# Patient Record
Sex: MALE | Race: BLACK OR AFRICAN AMERICAN | Employment: FULL TIME | URBAN - METROPOLITAN AREA
[De-identification: names, ages, dates, MRNs, and addresses within clinical notes are randomized per-mention and may not be internally consistent; named-entity substitution may affect disease eponyms.]

---

## 2017-05-25 ENCOUNTER — APPOINTMENT (OUTPATIENT)
Dept: GENERAL RADIOLOGY | Age: 22
End: 2017-05-25
Attending: EMERGENCY MEDICINE
Payer: COMMERCIAL

## 2017-05-25 ENCOUNTER — HOSPITAL ENCOUNTER (EMERGENCY)
Age: 22
Discharge: HOME | End: 2017-05-25
Attending: EMERGENCY MEDICINE
Payer: COMMERCIAL

## 2017-05-25 VITALS
SYSTOLIC BLOOD PRESSURE: 145 MMHG | OXYGEN SATURATION: 99 % | WEIGHT: 160 LBS | TEMPERATURE: 98.9 F | DIASTOLIC BLOOD PRESSURE: 90 MMHG | HEART RATE: 94 BPM | RESPIRATION RATE: 19 BRPM | HEIGHT: 69 IN | BODY MASS INDEX: 23.7 KG/M2

## 2017-05-25 DIAGNOSIS — S90.32XA CONTUSION OF FOOT INCLUDING TOES, LEFT, INITIAL ENCOUNTER: Primary | ICD-10-CM

## 2017-05-25 DIAGNOSIS — S90.122A CONTUSION OF FOOT INCLUDING TOES, LEFT, INITIAL ENCOUNTER: Primary | ICD-10-CM

## 2017-05-25 PROCEDURE — 73630 X-RAY EXAM OF FOOT: CPT

## 2017-05-25 PROCEDURE — 99283 EMERGENCY DEPT VISIT LOW MDM: CPT

## 2017-05-25 RX ORDER — IBUPROFEN 800 MG/1
800 TABLET ORAL EVERY 8 HOURS
Qty: 15 TAB | Refills: 0 | Status: SHIPPED | OUTPATIENT
Start: 2017-05-25 | End: 2017-05-30

## 2017-05-25 NOTE — ED NOTES
Current Discharge Medication List      START taking these medications    Details   ibuprofen (MOTRIN) 800 mg tablet Take 1 Tab by mouth every eight (8) hours for 5 days.   Qty: 15 Tab, Refills: 0           Patient armband removed and shredded  Prescription given and reviewed with patient

## 2017-05-25 NOTE — LETTER
[facility] EMERGENCY DEPT 
[address] [id number] 
[phone] Work/School Note Date: 5/25/2017 To Whom It May concern: 
 
Maricel Echavarria was seen and treated today in the emergency room by the following provider(s): 
Attending Provider: Kristina Yeager MD 
Physician Assistant: Sheridan Jeffries PA-C. Maricel Echavarria return to work 5/28/17 Sincerely, Sheridan Jeffries PA-C

## 2017-05-25 NOTE — ED PROVIDER NOTES
Patient is a 25 y.o. male presenting with foot pain. The history is provided by the patient. Foot Pain    This is a new problem. The current episode started 6 to 12 hours ago. The problem has not changed since onset. The pain is present in the left foot and left toes. The quality of the pain is described as sharp. The pain is moderate. Pertinent negatives include no numbness, full range of motion, no stiffness, no tingling, no itching, no back pain and no neck pain. The symptoms are aggravated by palpation, standing and activity. He has tried nothing for the symptoms. There has been a history of trauma (crush injury by object at work). History reviewed. No pertinent past medical history. History reviewed. No pertinent surgical history. History reviewed. No pertinent family history. Social History     Social History    Marital status: SINGLE     Spouse name: N/A    Number of children: N/A    Years of education: N/A     Occupational History    Not on file. Social History Main Topics    Smoking status: Not on file    Smokeless tobacco: Not on file    Alcohol use Not on file    Drug use: Not on file    Sexual activity: Not on file     Other Topics Concern    Not on file     Social History Narrative    No narrative on file         ALLERGIES: Review of patient's allergies indicates no known allergies. Review of Systems   Musculoskeletal: Negative for back pain, neck pain and stiffness. Skin: Negative for itching. Neurological: Negative for tingling and numbness. Skin: Negative for itching. Neurological: Negative for tingling and numbness. Review of Systems   Constitutional: Negative for chills and fever. HENT: Negative. Negative for congestion and facial swelling. Eyes: Negative for discharge and redness. Respiratory: Negative for cough and shortness of breath. Cardiovascular: Negative for chest pain.    Gastrointestinal: Negative for abdominal pain, nausea and vomiting. Endocrine: Negative. Genitourinary: Negative. Musculoskeletal: Negative for back pain and neck pain. Foot pain and swelling  Skin: Negative for rash and wound. Allergic/Immunologic: Negative. Neurological: Negative for dizziness, light-headedness and headaches. Hematological: Negative. Psychiatric/Behavioral: Negative. Skin: No laceration noted. Constitutional: He is oriented to person, place, and time. He appears well-developed and well-nourished. HENT:   Head: Normocephalic and atraumatic. Mouth/Throat: Oropharynx is clear and moist.   Eyes: Conjunctivae are normal.   Neck: Normal range of motion. Cardiovascular: Normal rate, regular rhythm and intact distal pulses. No murmur heard. Pulmonary/Chest: Effort normal. No respiratory distress. He has no wheezes. He has no rales. Abdominal: Soft. He exhibits no distension. Musculoskeletal: Normal range of motion. LT 1st toe TTP at nailbed and distal 1st digit. No swelling. Limited ROM of toe due to pain. Normal cap refill, sensation intact. Mild tenderness to base of 1st digit but no tenderness to foot or toes 2-3. Pedal pulse 2+  Neurological: He is alert and oriented to person, place, and time. Skin: Skin is warm and dry. No rash noted. Psychiatric: He has a normal mood and affect. Nursing note and vitals reviewed. Vitals:    05/25/17 1651   BP: 145/90   Pulse: 94   Resp: 19   Temp: 98.9 °F (37.2 °C)   SpO2: 99%   Weight: 72.6 kg (160 lb)   Height: 5' 9\" (1.753 m)            Physical Exam     MDM  Number of Diagnoses or Management Options  Contusion of foot including toes, left, initial encounter:   Diagnosis management comments: Impression: no fracture or dislocation appreciated on xray as read by self. No concern for [facility] fracture therefore no indication for splinting. Will give crutches and ibuprofen for comfort. Ortho f/u and work excuse.         Amount and/or Complexity of Data Reviewed  Tests in the radiology section of CPT®: ordered and reviewed      ED Course       Procedures

## 2017-05-25 NOTE — DISCHARGE INSTRUCTIONS
Bruises: Care Instructions  Your Care Instructions    Bruises occur when small blood vessels under the skin tear or rupture, most often from a twist, bump, or fall. Blood leaks into tissues under the skin and causes a black-and-blue spot that often turns colors, including purplish black, reddish blue, or yellowish green, as the bruise heals. Bruises hurt, but most are not serious and will go away on their own within 2 to 4 weeks. Sometimes, gravity causes them to spread down the body. A leg bruise usually will take longer to heal than a bruise on the face or arms. Follow-up care is a key part of your treatment and safety. Be sure to make and go to all appointments, and call your doctor if you are having problems. Its also a good idea to know your test results and keep a list of the medicines you take. How can you care for yourself at home? · Take pain medicines exactly as directed. ¨ If the doctor gave you a prescription medicine for pain, take it as prescribed. ¨ If you are not taking a prescription pain medicine, ask your doctor if you can take an over-the-counter medicine. · Put ice or a cold pack on the area for 10 to 20 minutes at a time. Put a thin cloth between the ice and your skin. · If you can, prop up the bruised area on pillows as much as possible for the next few days. Try to keep the bruise above the level of your heart. When should you call for help? Call your doctor now or seek immediate medical care if:  · You have signs of infection, such as:  ¨ Increased pain, swelling, warmth, or redness. ¨ Red streaks leading from the bruise. ¨ Pus draining from the bruise. ¨ A fever. · You have a bruise on your leg and signs of a blood clot, such as:  ¨ Increasing redness and swelling along with warmth, tenderness, and pain in the bruised area. ¨ Pain in your calf, back of the knee, thigh, or groin. ¨ Redness and swelling in your leg or groin. · Your pain gets worse.   Watch closely for changes in your health, and be sure to contact your doctor if:  · You do not get better as expected. Where can you learn more? Go to http://marii-darrick.info/. Enter [phone] [phone] in the search box to learn more about \"Bruises: Care Instructions. \"  Current as of: May 27, 2016  Content Version: 11.2  © 6475-3114 [employer] - [place]. Care instructions adapted under license by [employer] (which disclaims liability or warranty for this information). If you have questions about a medical condition or this instruction, always ask your healthcare professional. [place] [address] any warranty or liability for your use of this information. Contusion: Care Instructions  Your Care Instructions  Contusion is the medical term for a bruise. It is the result of a direct blow or an impact, such as a fall. Contusions are common sports injuries. Most people think of a bruise as a black-and-blue spot. This happens when small blood vessels get torn and leak blood under the skin. But bones, muscles, and organs can also get bruised. This may damage deep tissues but not cause a bruise you can see. The doctor will do a physical exam to find the location of your contusion. You may also have tests to make sure you do not have a more serious injury, such as a broken bone or nerve damage. These may include X-rays or other imaging tests like a CT scan or MRI. Deep-tissue contusions may cause pain and swelling. But if there is no serious damage, they will often get better in a few weeks with home treatment. The doctor has checked you carefully, but problems can develop later. If you notice any problems or new symptoms, get medical treatment right away. Follow-up care is a key part of your treatment and safety. Be sure to make and go to all appointments, and call your doctor if you are having problems.  It's also a good idea to know your test results and keep a list of the medicines you take.  How can you care for yourself at home? · Put ice or a cold pack on the sore area for 10 to 20 minutes at a time to stop swelling. Put a thin cloth between the ice pack and your skin. · Be safe with medicines. Read and follow all instructions on the label. ¨ If the doctor gave you a prescription medicine for pain, take it as prescribed. ¨ If you are not taking a prescription pain medicine, ask your doctor if you can take an over-the-counter medicine. · If you can, prop up the sore area on pillows as much as possible for the next few days. Try to keep the sore area above the level of your heart. When should you call for help? Call your doctor now or seek immediate medical care if:  · Your pain gets worse. · You have new or worse swelling. · You have tingling, weakness, or numbness in the area near the contusion. · The area near the contusion is cold or pale. Watch closely for changes in your health, and be sure to contact your doctor if:  · You do not get better as expected. Where can you learn more? Go to GeneriCo.be  Enter [id number] in the search box to learn more about \"Contusion: Care Instructions. \"   © 7529-6208 [employer]. Care instructions adapted under license by Branden Jones (which disclaims liability or warranty for this information). This care instruction is for use with your licensed healthcare professional. If you have questions about a medical condition or this instruction, always ask your healthcare professional. [place] [address] any warranty or liability for your use of this information.   Content Version: 58.6.239586; Current as of: May 22, 2015

## 2017-09-19 ENCOUNTER — HOSPITAL ENCOUNTER (INPATIENT)
Age: 22
LOS: 3 days | Discharge: HOME | DRG: 885 | End: 2017-09-22
Attending: EMERGENCY MEDICINE | Admitting: PSYCHIATRY & NEUROLOGY
Payer: COMMERCIAL

## 2017-09-19 DIAGNOSIS — F10.10 ALCOHOL ABUSE: ICD-10-CM

## 2017-09-19 DIAGNOSIS — R45.851 PASSIVE SUICIDAL IDEATIONS: Primary | ICD-10-CM

## 2017-09-19 PROBLEM — F32.A DEPRESSION: Status: ACTIVE | Noted: 2017-09-19

## 2017-09-19 LAB
ALBUMIN SERPL-MCNC: 4.3 G/DL (ref 3.4–5)
ALBUMIN/GLOB SERPL: 0.9 {RATIO} (ref 0.8–1.7)
ALP SERPL-CCNC: 100 U/L (ref 45–117)
ALT SERPL-CCNC: 192 U/L (ref 16–61)
AMPHET UR QL SCN: NEGATIVE
AMYLASE SERPL-CCNC: 92 U/L (ref 25–115)
ANION GAP SERPL CALC-SCNC: 8 MMOL/L (ref 3–18)
APAP SERPL-MCNC: <2 UG/ML (ref 10–30)
APPEARANCE UR: CLEAR
AST SERPL-CCNC: 234 U/L (ref 15–37)
ATRIAL RATE: 87 BPM
BACTERIA URNS QL MICRO: NEGATIVE /HPF
BARBITURATES UR QL SCN: NEGATIVE
BASOPHILS # BLD: 0 K/UL (ref 0–0.06)
BASOPHILS NFR BLD: 0 % (ref 0–2)
BENZODIAZ UR QL: NEGATIVE
BILIRUB SERPL-MCNC: 0.7 MG/DL (ref 0.2–1)
BILIRUB UR QL: NEGATIVE
BUN SERPL-MCNC: 10 MG/DL (ref 7–18)
BUN/CREAT SERPL: 9 (ref 12–20)
CALCIUM SERPL-MCNC: 9.4 MG/DL (ref 8.5–10.1)
CALCULATED P AXIS, ECG09: 76 DEGREES
CALCULATED R AXIS, ECG10: 97 DEGREES
CALCULATED T AXIS, ECG11: 47 DEGREES
CANNABINOIDS UR QL SCN: NEGATIVE
CHLORIDE SERPL-SCNC: 94 MMOL/L (ref 100–108)
CO2 SERPL-SCNC: 36 MMOL/L (ref 21–32)
COCAINE UR QL SCN: NEGATIVE
COLOR UR: YELLOW
CREAT SERPL-MCNC: 1.06 MG/DL (ref 0.6–1.3)
DIAGNOSIS, 93000: NORMAL
DIFFERENTIAL METHOD BLD: ABNORMAL
EOSINOPHIL # BLD: 0 K/UL (ref 0–0.4)
EOSINOPHIL NFR BLD: 0 % (ref 0–5)
EPITH CASTS URNS QL MICRO: ABNORMAL /LPF (ref 0–5)
ERYTHROCYTE [DISTWIDTH] IN BLOOD BY AUTOMATED COUNT: 13.3 % (ref 11.6–14.5)
ETHANOL SERPL-MCNC: 91 MG/DL (ref 0–3)
GLOBULIN SER CALC-MCNC: 4.6 G/DL (ref 2–4)
GLUCOSE SERPL-MCNC: 120 MG/DL (ref 74–99)
GLUCOSE UR STRIP.AUTO-MCNC: NEGATIVE MG/DL
HCT VFR BLD AUTO: 43.2 % (ref 36–48)
HDSCOM,HDSCOM: NORMAL
HGB BLD-MCNC: 15.1 G/DL (ref 13–16)
HGB UR QL STRIP: NEGATIVE
KETONES UR QL STRIP.AUTO: ABNORMAL MG/DL
LEUKOCYTE ESTERASE UR QL STRIP.AUTO: NEGATIVE
LIPASE SERPL-CCNC: 586 U/L (ref 73–393)
LIPASE SERPL-CCNC: 783 U/L (ref 73–393)
LYMPHOCYTES # BLD: 1 K/UL (ref 0.9–3.6)
LYMPHOCYTES NFR BLD: 17 % (ref 21–52)
MAGNESIUM SERPL-MCNC: 1.6 MG/DL (ref 1.6–2.6)
MCH RBC QN AUTO: 29.2 PG (ref 24–34)
MCHC RBC AUTO-ENTMCNC: 35 G/DL (ref 31–37)
MCV RBC AUTO: 83.4 FL (ref 74–97)
METHADONE UR QL: NEGATIVE
MONOCYTES # BLD: 0.4 K/UL (ref 0.05–1.2)
MONOCYTES NFR BLD: 7 % (ref 3–10)
MUCOUS THREADS URNS QL MICRO: ABNORMAL /LPF
NEUTS SEG # BLD: 4.4 K/UL (ref 1.8–8)
NEUTS SEG NFR BLD: 76 % (ref 40–73)
NITRITE UR QL STRIP.AUTO: NEGATIVE
OPIATES UR QL: NEGATIVE
P-R INTERVAL, ECG05: 130 MS
PCP UR QL: NEGATIVE
PH UR STRIP: 7 [PH] (ref 5–8)
PLATELET # BLD AUTO: 197 K/UL (ref 135–420)
PMV BLD AUTO: 8.9 FL (ref 9.2–11.8)
POTASSIUM SERPL-SCNC: 3.6 MMOL/L (ref 3.5–5.5)
PROT SERPL-MCNC: 8.9 G/DL (ref 6.4–8.2)
PROT UR STRIP-MCNC: 30 MG/DL
Q-T INTERVAL, ECG07: 346 MS
QRS DURATION, ECG06: 82 MS
QTC CALCULATION (BEZET), ECG08: 416 MS
RBC # BLD AUTO: 5.18 M/UL (ref 4.7–5.5)
RBC #/AREA URNS HPF: NEGATIVE /HPF (ref 0–5)
SALICYLATES SERPL-MCNC: <2.8 MG/DL (ref 2.8–20)
SODIUM SERPL-SCNC: 138 MMOL/L (ref 136–145)
SP GR UR REFRACTOMETRY: 1.01 (ref 1–1.03)
UROBILINOGEN UR QL STRIP.AUTO: 2 EU/DL (ref 0.2–1)
VENTRICULAR RATE, ECG03: 87 BPM
WBC # BLD AUTO: 5.9 K/UL (ref 4.6–13.2)
WBC URNS QL MICRO: ABNORMAL /HPF (ref 0–4)

## 2017-09-19 PROCEDURE — 80307 DRUG TEST PRSMV CHEM ANLYZR: CPT | Performed by: EMERGENCY MEDICINE

## 2017-09-19 PROCEDURE — 96361 HYDRATE IV INFUSION ADD-ON: CPT

## 2017-09-19 PROCEDURE — 93005 ELECTROCARDIOGRAM TRACING: CPT

## 2017-09-19 PROCEDURE — 83690 ASSAY OF LIPASE: CPT | Performed by: EMERGENCY MEDICINE

## 2017-09-19 PROCEDURE — 81001 URINALYSIS AUTO W/SCOPE: CPT | Performed by: EMERGENCY MEDICINE

## 2017-09-19 PROCEDURE — 74011250636 HC RX REV CODE- 250/636: Performed by: EMERGENCY MEDICINE

## 2017-09-19 PROCEDURE — 96375 TX/PRO/DX INJ NEW DRUG ADDON: CPT

## 2017-09-19 PROCEDURE — 82150 ASSAY OF AMYLASE: CPT | Performed by: EMERGENCY MEDICINE

## 2017-09-19 PROCEDURE — 99285 EMERGENCY DEPT VISIT HI MDM: CPT

## 2017-09-19 PROCEDURE — 83735 ASSAY OF MAGNESIUM: CPT | Performed by: EMERGENCY MEDICINE

## 2017-09-19 PROCEDURE — 74011000250 HC RX REV CODE- 250: Performed by: EMERGENCY MEDICINE

## 2017-09-19 PROCEDURE — 65220000003 HC RM SEMIPRIVATE PSYCH

## 2017-09-19 PROCEDURE — 85025 COMPLETE CBC W/AUTO DIFF WBC: CPT | Performed by: EMERGENCY MEDICINE

## 2017-09-19 PROCEDURE — 96374 THER/PROPH/DIAG INJ IV PUSH: CPT

## 2017-09-19 PROCEDURE — 74011250637 HC RX REV CODE- 250/637: Performed by: PSYCHIATRY & NEUROLOGY

## 2017-09-19 PROCEDURE — 80053 COMPREHEN METABOLIC PANEL: CPT | Performed by: EMERGENCY MEDICINE

## 2017-09-19 RX ORDER — LORAZEPAM 1 MG/1
2 TABLET ORAL
Status: DISCONTINUED | OUTPATIENT
Start: 2017-09-19 | End: 2017-09-22 | Stop reason: HOSPADM

## 2017-09-19 RX ORDER — PROMETHAZINE HYDROCHLORIDE 25 MG/ML
25 INJECTION, SOLUTION INTRAMUSCULAR; INTRAVENOUS
Status: DISCONTINUED | OUTPATIENT
Start: 2017-09-19 | End: 2017-09-19

## 2017-09-19 RX ORDER — HALOPERIDOL 5 MG/ML
5 INJECTION INTRAMUSCULAR
Status: DISCONTINUED | OUTPATIENT
Start: 2017-09-19 | End: 2017-09-22 | Stop reason: HOSPADM

## 2017-09-19 RX ORDER — CHLORDIAZEPOXIDE HYDROCHLORIDE 25 MG/1
25 CAPSULE, GELATIN COATED ORAL
Status: DISCONTINUED | OUTPATIENT
Start: 2017-09-19 | End: 2017-09-19

## 2017-09-19 RX ORDER — IBUPROFEN 400 MG/1
400 TABLET ORAL
Status: DISCONTINUED | OUTPATIENT
Start: 2017-09-19 | End: 2017-09-22 | Stop reason: HOSPADM

## 2017-09-19 RX ORDER — LORAZEPAM 2 MG/ML
2 INJECTION INTRAMUSCULAR
Status: DISCONTINUED | OUTPATIENT
Start: 2017-09-19 | End: 2017-09-22 | Stop reason: HOSPADM

## 2017-09-19 RX ORDER — LORAZEPAM 1 MG/1
1 TABLET ORAL
Status: DISCONTINUED | OUTPATIENT
Start: 2017-09-19 | End: 2017-09-20

## 2017-09-19 RX ORDER — LORAZEPAM 2 MG/ML
1 INJECTION INTRAMUSCULAR
Status: COMPLETED | OUTPATIENT
Start: 2017-09-19 | End: 2017-09-19

## 2017-09-19 RX ORDER — LORAZEPAM 2 MG/ML
1-2 INJECTION INTRAMUSCULAR
Status: DISCONTINUED | OUTPATIENT
Start: 2017-09-19 | End: 2017-09-19

## 2017-09-19 RX ORDER — IBUPROFEN 600 MG/1
600 TABLET ORAL
Status: DISCONTINUED | OUTPATIENT
Start: 2017-09-19 | End: 2017-09-19

## 2017-09-19 RX ORDER — LORAZEPAM 1 MG/1
1-2 TABLET ORAL
Status: DISCONTINUED | OUTPATIENT
Start: 2017-09-19 | End: 2017-09-19

## 2017-09-19 RX ORDER — ONDANSETRON 2 MG/ML
4 INJECTION INTRAMUSCULAR; INTRAVENOUS
Status: COMPLETED | OUTPATIENT
Start: 2017-09-19 | End: 2017-09-19

## 2017-09-19 RX ORDER — MAGNESIUM SULFATE HEPTAHYDRATE 500 MG/ML
1 INJECTION, SOLUTION INTRAMUSCULAR; INTRAVENOUS
Status: ACTIVE | OUTPATIENT
Start: 2017-09-19 | End: 2017-09-21

## 2017-09-19 RX ORDER — HALOPERIDOL 5 MG/1
5 TABLET ORAL
Status: DISCONTINUED | OUTPATIENT
Start: 2017-09-19 | End: 2017-09-22 | Stop reason: HOSPADM

## 2017-09-19 RX ORDER — IBUPROFEN 200 MG
1 TABLET ORAL DAILY
Status: DISCONTINUED | OUTPATIENT
Start: 2017-09-19 | End: 2017-09-22 | Stop reason: HOSPADM

## 2017-09-19 RX ORDER — TRAZODONE HYDROCHLORIDE 50 MG/1
50 TABLET ORAL
Status: DISCONTINUED | OUTPATIENT
Start: 2017-09-19 | End: 2017-09-22 | Stop reason: HOSPADM

## 2017-09-19 RX ORDER — PROMETHAZINE HYDROCHLORIDE 25 MG/1
25 SUPPOSITORY RECTAL
Status: DISCONTINUED | OUTPATIENT
Start: 2017-09-19 | End: 2017-09-19

## 2017-09-19 RX ADMIN — ONDANSETRON 4 MG: 2 INJECTION, SOLUTION INTRAMUSCULAR; INTRAVENOUS at 10:45

## 2017-09-19 RX ADMIN — LORAZEPAM 1 MG: 1 TABLET ORAL at 22:18

## 2017-09-19 RX ADMIN — LORAZEPAM 1 MG: 2 INJECTION INTRAMUSCULAR; INTRAVENOUS at 10:49

## 2017-09-19 RX ADMIN — SODIUM CHLORIDE 1000 ML: 900 INJECTION, SOLUTION INTRAVENOUS at 10:45

## 2017-09-19 RX ADMIN — Medication 2 CAPSULE: at 16:56

## 2017-09-19 RX ADMIN — FOLIC ACID: 5 INJECTION, SOLUTION INTRAMUSCULAR; INTRAVENOUS; SUBCUTANEOUS at 07:01

## 2017-09-19 RX ADMIN — LORAZEPAM 1 MG: 1 TABLET ORAL at 18:15

## 2017-09-19 NOTE — IP AVS SNAPSHOT
Magnus Ferrari 
 
 
 [address] [facility] [address] Patient: John Butler MRN: [id number] :1995 You are allergic to the following No active allergies Immunizations Administered for This Admission Name Date Influenza Vaccine (Quad) PF 2017 Recent Documentation Weight BMI Smoking Status 72.6 kg 23.63 kg/m2 Current Every Day Smoker Emergency Contacts Name Discharge Info Relation Home Work Mobile Mayela Mendosa  Mother [14] [phone] About your hospitalization You were admitted on:  2017 You last received care in the:  [facility] ADULT CHEM DEP You were discharged on:  2017 Unit phone number:  [phone] Why you were hospitalized Your primary diagnosis was:  Mdd (Major Depressive Disorder), Recurrent Severe, 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) Your diagnoses also included:  Alcohol Use Disorder, Severe, Dependence (Hcc), Alcohol Withdrawal Syndrome Without Complication (Hcc), Elevated Lfts, Elevated Lipase Providers Seen During Your Hospitalizations Provider Role Specialty Primary office phone Titi Falukner MD Attending Provider Emergency Medicine [phone] Harsh Mejia MD Attending Provider Emergency Medicine [phone] Pantera Ochoa MD Attending Provider Emergency Medicine [phone] Zackery Haynes MD Attending Provider Psychiatry [phone] Your Primary Care Physician (PCP) Primary Care Physician Office Phone Office Fax OTHER, PHYS ** None ** ** None ** Follow-up Information Follow up With Details Comments Contact Info Paolo Christopher MD   Patient can only remember the practice name and not the physician MALACHI SALEH On 10/11/2017 For therapy intake appointment with Mrs. Kay at 10:15 am. Will be provided a medication appointment a later date. [address] [facility] 
[phone] Current Discharge Medication List  
  
START taking these medications Dose & Instructions Dispensing Information Comments Morning Noon Evening Bedtime  
 amino acids/multivit with iron Cap capsule Commonly known as:  Alivia Cabralain Your last dose was: Your next dose is:    
   
   
 Dose:  2 Cap Take 2 Caps by mouth three (3) times daily (with meals). Indications: VITAMIN DEFICIENCY Quantity:  12 Cap Refills:  0  
     
   
   
   
  
 cloNIDine HCl 0.1 mg tablet Commonly known as:  CATAPRES Your last dose was: Your next dose is: Take 1 tablet (0.1mg) po TID for 1 day then decrease to a half tablet (0.05mg) po TID for 4 doses then discontinue. Indications: alcohol withdrawal  
 Quantity:  6 Tab Refills:  0 QUEtiapine 100 mg tablet Commonly known as:  SEROquel Your last dose was: Your next dose is:    
   
   
 Dose:  100 mg Take 1 Tab by mouth nightly. Indications: mood stabilization and insomnia related to alcohol withdrawal  
 Quantity:  30 Tab Refills:  0 Where to Get Your Medications Information on where to get these meds will be given to you by the nurse or doctor. ! Ask your nurse or doctor about these medications  
  amino acids/multivit with iron Cap capsule  
 cloNIDine HCl 0.1 mg tablet QUEtiapine 100 mg tablet Discharge Instructions BEHAVIORAL HEALTH NURSING DISCHARGE NOTE Numbers to remember: 
[facility]: [phone] [facility]: [phone] Suicide Preventions: [phone] (TALK) Diet: Regular The discharge information has been reviewed with the patient. The patient verbalized understanding. Patient armband removed and shredded Discharge Orders None [facility] Announcement We are excited to announce that we are making your provider's discharge notes available to you in ZimpleMoney. You will see these notes when they are completed and signed by the physician that discharged you from your recent hospital stay. If you have any questions or concerns about any information you see in ZimpleMoney, please call the Health Information Department where you were seen or reach out to your Primary Care Provider for more information about your plan of care. Introducing [facility]! Huma Santillan introduces ZimpleMoney patient portal. Now you can access parts of your medical record, email your doctor's office, and request medication refills online. 1. In your internet browser, go to [email or web address] 2. Click on the First Time User? Click Here link in the Sign In box. You will see the New Member Sign Up page. 3. Enter your ZimpleMoney Access Code exactly as it appears below. You will not need to use this code after youve completed the sign-up process. If you do not sign up before the expiration date, you must request a new code. · ZimpleMoney Access Code: [email or web address] Expires: 12/18/2017  6:56 AM 
 
4. Enter the last four digits of your Social Security Number (xxxx) and Date of Birth (mm/dd/yyyy) as indicated and click Submit. You will be taken to the next sign-up page. 5. Create a ZimpleMoney ID. This will be your ZimpleMoney login ID and cannot be changed, so think of one that is secure and easy to remember. 6. Create a ZimpleMoney password. You can change your password at any time. 7. Enter your Password Reset Question and Answer. This can be used at a later time if you forget your password. 8. Enter your e-mail address. You will receive e-mail notification when new information is available in [address] 9. Click Sign Up. You can now view and download portions of your medical record.  
10. Click the Download Summary menu link to download a portable copy of your medical information. If you have questions, please visit the Frequently Asked Questions section of the SocialCom[employer] website. Remember, MyChart is NOT to be used for urgent needs. For medical emergencies, dial 911. Now available from your iPhone and Android! General Information Please provide this summary of care documentation to your next provider. Patient Signature:  ____________________________________________________________ Date:  ____________________________________________________________  
  
Janel Fines Provider Signature:  ____________________________________________________________ Date:  ____________________________________________________________

## 2017-09-19 NOTE — ED NOTES
10:29 AM: Dr. Nigel Castillo evaluating patient at bedside. Consult:  Discussed care with arthur Agrawal. Standard discussion; including history of patients chief complaint, available diagnostic results, and treatment course. Will evaluate patient. 11:16 AM, 9/19/2017     Consult:  Discussed care with arthur Agrawal. Standard discussion; including history of patients chief complaint, available diagnostic results, and treatment course. Will admit for obs. 11:43 AM, 9/19/2017     Vitals:  Patient Vitals for the past 24 hrs:   Temp Pulse Resp BP   09/20/17 0730 97.9 °F (36.6 °C) 83 16 (!) 169/102         Medications ordered:   Medications   haloperidol (HALDOL) tablet 5 mg (not administered)   haloperidol lactate (HALDOL) injection 5 mg (not administered)   traZODone (DESYREL) tablet 50 mg (not administered)   LORazepam (ATIVAN) tablet 2 mg (2 mg Oral Given 9/20/17 2214)   LORazepam (ATIVAN) injection 2 mg (not administered)   amino acids/multivit with iron (MARYANN-RECOVER) capsule 2 Cap (2 Caps Oral Given 9/20/17 1708)   magnesium sulfate injection 1 g (not administered)   ibuprofen (MOTRIN) tablet 400 mg (not administered)   trimethobenzamide (TIGAN) injection 200 mg (not administered)   influenza vaccine 2017-18 (3 yrs+)(PF) (FLUZONE QUAD/FLUARIX QUAD) injection 0.5 mL (not administered)   nicotine (NICODERM CQ) 14 mg/24 hr patch 1 Patch (1 Patch TransDERmal Apply Patch 9/20/17 0833)   QUEtiapine (SEROquel) tablet 50 mg (50 mg Oral Given 9/20/17 2037)   ondansetron hcl (ZOFRAN) tablet 4 mg (not administered)   ondansetron (ZOFRAN) injection 4 mg (not administered)   cloNIDine HCl (CATAPRES) tablet 0.1 mg (0.1 mg Oral Given 9/20/17 2036)   0.9% sodium chloride 1,000 mL with mvi, adult no. 4 with vit K 10 mL, thiamine 062 mg, folic acid 1 mg infusion ( IntraVENous New Bag 9/19/17 0701)   sodium chloride 0.9 % bolus infusion 1,000 mL (1,000 mL IntraVENous New Bag 9/19/17 9681)   ondansetron (ZOFRAN) injection 4 mg (4 mg IntraVENous Given 9/19/17 1045)   LORazepam (ATIVAN) injection 1 mg (1 mg IntraVENous Given 9/19/17 1049)         Lab findings:  No results found for this or any previous visit (from the past 12 hour(s)). X-Ray, CT or other radiology findings or impressions:  No orders to display         Disposition:  Diagnosis:   1. Passive suicidal ideations    2. Alcohol abuse        Disposition: Admitted. Follow-up Information     None           There are no discharge medications for this patient. Scribe Attestation      Afsaneh acting as a scribe for and in the presence of Little Hope MD      September 19, 2017 at 10:30 AM       Provider Attestation:      I personally performed the services described in the documentation, reviewed the documentation, as recorded by the scribe in my presence, and it accurately and completely records my words and actions.  September 19, 2017 at 10:30 AM - Little Hope MD

## 2017-09-19 NOTE — BSMART NOTE
Comprehensive Assessment Form Part 1    Section I - Disposition      The Medical Doctor to Psychiatrist conference was completed. The Medical Doctor is in agreement with Psychiatrist disposition because of (reason) fleeting suicidal ideations and alcohol detox. The plan is admit to inpatient. The on-call Psychiatrist consulted was Dr. Blanche Yadav. The admitting Psychiatrist will be Dr. Blanche Yadav. The admitting Diagnosis is Depression, Alcohol Abuse. Admitted to room 125/01  Unit ADCD      Section II - Integrated Summary  Summary:  25year old male sent to the ER from [facility] for a mental health evaluation in regards to fleeting suicidal ideations and alcohol abuse. Interviewed in the ER by the request of Dr. Gianluca Ga. Patient dressed in hospital gown. Alert and oriented. Cooperative with interview. Appears anxious. States he has been under a lot of stress lately. Reports he and his girlfriend have lived together for 6 years. He recently bought a car and she left and went to [place] with her 1year old child, leaving him to care for their 4 month child. States he has been drinking heavily lately, consuming near a fifth of Gin daily. Reports is not sleeping well and appetite poor. Reports has been having emesis after trying to eat. Passively suicidal \" I just would rather not wake up. \"  Denied any homicidal ideations. Currently with mild hand tremors. Appears rather depressed and sad. Has never had any Psychiatric care before. No Meds, NKDA    Had a maternal Uncle who passed away from complications from alcohol dependence. Patient 2 month old is currently being cared for by the Grandmother. The patient is deemed competent to provide informed consent. The Chief Complaint is depression with fleeting suicidal ideations. Seeking alcohol detox  . The Precipitant Factors are relationship problems, Father of a 2 month old, alcohol abuse, and questionable depression untreated. .      Section V - Substance Abuse  The patient is using substances. The patient is using alcohol for 1-5 years with last use on 9/19/17. The patient has experienced the following withdrawal symptoms,  tremors, vomiting and sleep disturbance.       Georgie Leong RN

## 2017-09-19 NOTE — BH NOTES
Patient arrived on the unit escorted by security. Patient states that he has been depressed for about a year and denies active SI/HI. Patient states that his girlfriend took off with the car and her three year old child (1 y.o. Has a different father than patient). Patient has a 2 month old with his girlfriend who is currently with her grandmother (girlfriend's mother) until \"I can get right\". Patient admits to drinking a fifth of alcohol daily. Patient states that he has always drank, but over the past 6 moths he is drinking to drown out the depression. Patient is pleasant and cooperative and states that he just wants to get some help. Patient states that he is not able to get in touch with his girlfriend and her mother cannot either. Patient contracts for safety and denies all hallucinations. Patient was oriented to unit and rules were explained. Patient was allowed time for questions. Patient has noticeable tremors and states that he is a little nauseous but otherwise has no other signs of withdrawal currently.

## 2017-09-19 NOTE — ED PROVIDER NOTES
HPI Comments: 6:23 AM Hernán Zuniga is a 25 y.o. male with no PMHx who presents to ED for the evaluation of vomiting onset for the past two days. Pt is unable to keep any food or fluids including water down. He states that he six drinks ETOH beverages a day and smokes regularly. Pt also says that he is feeling SI due to the mother of his child leaving him alone to care for there [de-identified] old baby just recently. Pt states that he is here in the ED to receive help. No other complaints, associated symptoms or modifying factors at this time. PCP: Paolo Christopher MD            The history is provided by the patient. History reviewed. No pertinent past medical history. History reviewed. No pertinent surgical history. History reviewed. No pertinent family history. Social History     Social History    Marital status: SINGLE     Spouse name: N/A    Number of children: N/A    Years of education: N/A     Occupational History    Not on file. Social History Main Topics    Smoking status: Current Every Day Smoker    Smokeless tobacco: Not on file    Alcohol use Yes      Comment: 5-6 liquor drinks daily    Drug use: No    Sexual activity: Not on file     Other Topics Concern    Not on file     Social History Narrative         ALLERGIES: Review of patient's allergies indicates no known allergies. Review of Systems   Constitutional: Negative. HENT: Negative. Eyes: Negative. Respiratory: Negative. Cardiovascular: Negative. Gastrointestinal: Positive for vomiting. Endocrine: Negative. Genitourinary: Negative. Musculoskeletal: Negative. Skin: Negative. Allergic/Immunologic: Negative. Neurological: Negative. Hematological: Negative. Psychiatric/Behavioral: Positive for suicidal ideas. All other systems reviewed and are negative.       Vitals:    09/19/17 0734 09/19/17 0830 09/19/17 1032 09/19/17 1448   BP: 161/88 133/90 (!) 158/99 (!) 156/109   Pulse: 97 92 (!) 103 (!) 108   Resp: 19 17 18 18   Temp:   98.3 °F (36.8 °C) 98.7 °F (37.1 °C)   SpO2: 100% 97% 99%    Weight:                Physical Exam   Constitutional: He is oriented to person, place, and time. He appears well-developed and well-nourished. No distress. HENT:   Head: Normocephalic. Mouth/Throat: Oropharynx is clear and moist.   Eyes: Conjunctivae and EOM are normal. Pupils are equal, round, and reactive to light. Neck: Normal range of motion. Neck supple. Cardiovascular: Normal rate, regular rhythm, normal heart sounds and intact distal pulses. No murmur heard. Pulmonary/Chest: Effort normal and breath sounds normal. No respiratory distress. He has no wheezes. He has no rales. He exhibits no tenderness. Abdominal: Soft. Bowel sounds are normal. He exhibits no distension. There is no tenderness. There is no rebound. Musculoskeletal: Normal range of motion. He exhibits no edema or tenderness. Neurological: He is alert and oriented to person, place, and time. No cranial nerve deficit. He exhibits normal muscle tone. Coordination normal.   Skin: Skin is warm and dry. No rash noted. Psychiatric: He has a normal mood and affect. His behavior is normal. Judgment and thought content normal.   Nursing note and vitals reviewed.        MDM  Number of Diagnoses or Management Options     Amount and/or Complexity of Data Reviewed  Clinical lab tests: ordered and reviewed    Risk of Complications, Morbidity, and/or Mortality  Presenting problems: moderate  Diagnostic procedures: high  Management options: moderate    Patient Progress  Patient progress: stable    ED Course       Procedures    Vitals:  Patient Vitals for the past 12 hrs:   Temp Pulse Resp BP SpO2   09/19/17 1448 98.7 °F (37.1 °C) (!) 108 18 (!) 156/109 -   09/19/17 1032 98.3 °F (36.8 °C) (!) 103 18 (!) 158/99 99 %   09/19/17 0830 - 92 17 133/90 97 %         Medications ordered:   Medications   haloperidol (HALDOL) tablet 5 mg (not administered)   haloperidol lactate (HALDOL) injection 5 mg (not administered)   traZODone (DESYREL) tablet 50 mg (not administered)   LORazepam (ATIVAN) tablet 1 mg (1 mg Oral Given 9/19/17 1815)   LORazepam (ATIVAN) tablet 2 mg (not administered)   LORazepam (ATIVAN) injection 2 mg (not administered)   amino acids/multivit with iron (MARYANN-RECOVER) capsule 2 Cap (2 Caps Oral Given 9/19/17 1656)   magnesium sulfate injection 1 g (not administered)   ibuprofen (MOTRIN) tablet 400 mg (not administered)   trimethobenzamide (TIGAN) injection 200 mg (not administered)   influenza vaccine 2017-18 (3 yrs+)(PF) (FLUZONE QUAD/FLUARIX QUAD) injection 0.5 mL (not administered)   nicotine (NICODERM CQ) 14 mg/24 hr patch 1 Patch (1 Patch TransDERmal Apply Patch 9/19/17 1656)   0.9% sodium chloride 1,000 mL with mvi, adult no. 4 with vit K 10 mL, thiamine 044 mg, folic acid 1 mg infusion ( IntraVENous New Bag 9/19/17 0701)   sodium chloride 0.9 % bolus infusion 1,000 mL (1,000 mL IntraVENous New Bag 9/19/17 1045)   ondansetron (ZOFRAN) injection 4 mg (4 mg IntraVENous Given 9/19/17 1045)   LORazepam (ATIVAN) injection 1 mg (1 mg IntraVENous Given 9/19/17 1049)         Lab findings:  Recent Results (from the past 12 hour(s))   LIPASE    Collection Time: 09/19/17 12:23 PM   Result Value Ref Range    Lipase 783 (H) 73 - 393 U/L       Progress notes, Consult notes or additional Procedure notes: patient remained stable. 0700 - Took sign out from Dr. Porsche Awan on Mr. Winn, a 25year old male who presents to the ED for evaluation of passive SI, hallucinations, anxiety, and alcohol abuse. Worried that if he falls asleep that he \"won't wake up. \" Currently awaiting for UDS results, and plan is to speak with Crisis Stabilization and arrange for transfer to [facility] ED for further evaluation    Diagnosis: Suicidal ideation, depression, anxiety    Disposition: Transfer to [facility] ED    Follow-up Information     None           There are no discharge medications for this patient. Scribe Attestation:   Julianne Soliman acting as a scribe for and in the presence of Swapnil Norwood MD September 19, 2017 at 6:23 AM     Signed by: Nolan Vasquez, September 19, 2017 at 6:23 AM     Provider Attestation:   I personally performed the services described in the documentation, reviewed the documentation, as recorded by the scribe in my presence, and it accurately and completely records my words and actions. Reviewed and signed by:   Swapnil Norwood MD    6:57 AM  I have discussed case with Dr. Rosa Nazario discussion regarding this patient's presenting symptoms, PMHX, exam, vital signs, available ancillary and diagnostic studies. Consulting agrees to take over care of the patient at this time due to my going off duty.

## 2017-09-19 NOTE — BH NOTES
GROUP THERAPY PROGRESS NOTE    Armida Singh is participating in [place].      Group time: 30 min    Personal goal for participation: Positive social interaction    Goal orientation: community/social    Group therapy participation: active    Therapeutic interventions reviewed and discussed: Positive communication and social interaction    Impression of participation: Pt.was quiet but calm and cooperative

## 2017-09-19 NOTE — IP AVS SNAPSHOT
Crystal Alvarado 
 
 
 [address] [facility] [address] Patient: Ronny Edmonds MRN: [id number] :1995 Current Discharge Medication List  
  
START taking these medications Dose & Instructions Dispensing Information Comments Morning Noon Evening Bedtime  
 amino acids/multivit with iron Cap capsule Commonly known as:  Temitope Baker Your last dose was: Your next dose is:    
   
   
 Dose:  2 Cap Take 2 Caps by mouth three (3) times daily (with meals). Indications: VITAMIN DEFICIENCY Quantity:  12 Cap Refills:  0  
     
   
   
   
  
 cloNIDine HCl 0.1 mg tablet Commonly known as:  CATAPRES Your last dose was: Your next dose is: Take 1 tablet (0.1mg) po TID for 1 day then decrease to a half tablet (0.05mg) po TID for 4 doses then discontinue. Indications: alcohol withdrawal  
 Quantity:  6 Tab Refills:  0 QUEtiapine 100 mg tablet Commonly known as:  SEROquel Your last dose was: Your next dose is:    
   
   
 Dose:  100 mg Take 1 Tab by mouth nightly. Indications: mood stabilization and insomnia related to alcohol withdrawal  
 Quantity:  30 Tab Refills:  0 Where to Get Your Medications Information on where to get these meds will be given to you by the nurse or doctor. ! Ask your nurse or doctor about these medications  
  amino acids/multivit with iron Cap capsule  
 cloNIDine HCl 0.1 mg tablet QUEtiapine 100 mg tablet

## 2017-09-19 NOTE — IP AVS SNAPSHOT
Summary of Care Report The Summary of Care report has been created to help improve care coordination. Users with access to [employer] or [employer] [address] (Web-based application) may access additional patient information including the Discharge Summary. If you are not currently a [address] user and need more information, please call the number listed below in the [place] [address] section and ask to be connected with Medical Records. Facility Information Name Address Phone [address] [facility]  [address] [facility] [address] [phone] Patient Information Patient Name Sex  Kaden Aviles ([id number]) Male 1995 Discharge Information Admitting Provider Service Area Unit Vianey Apodaca MD / [address] Adult Chem Dep / [phone] Discharge Provider Discharge Date/Time Discharge Disposition Destination Vianey Apodaca MD / [phone] 17 1533 [facility] (none) Patient Language Language ENGLISH [13] Hospital Problems as of 2017  Never Reviewed Class Noted - Resolved Last Modified POA Active Problems * (Principal)MDD (major depressive disorder), recurrent severe, without psychosis ([facility]  2017 - Present 2017 by Vianey Apodaca MD Yes Entered by Vianey Apodaca MD  
  Alcohol use disorder, severe, dependence ([facility]  2017 - Present 2017 by Vianey Apodaca MD Yes Entered by Vianey Apodaca MD  
  Alcohol withdrawal syndrome without complication ([facility]   - Present 2017 by Vianey Apodaca MD Unknown Entered by Vianey Apodaca MD  
  Elevated LFTs  2017 - Present 2017 by Vianey Apodaca MD Unknown Entered by Vianey Apodaca MD  
  Elevated lipase  2017 - Present 2017 by Vianey Apodaca MD Unknown   Entered by Vianey Apodaca MD  
  
 You are allergic to the following No active allergies Current Discharge Medication List  
  
START taking these medications Dose & Instructions Dispensing Information Comments  
 amino acids/multivit with iron Cap capsule Commonly known as:  Oliver Reis Dose:  2 Cap Take 2 Caps by mouth three (3) times daily (with meals). Indications: VITAMIN DEFICIENCY Quantity:  12 Cap Refills:  0  
   
 cloNIDine HCl 0.1 mg tablet Commonly known as:  CATAPRES Take 1 tablet (0.1mg) po TID for 1 day then decrease to a half tablet (0.05mg) po TID for 4 doses then discontinue. Indications: alcohol withdrawal  
 Quantity:  6 Tab Refills:  0 QUEtiapine 100 mg tablet Commonly known as:  SEROquel Dose:  100 mg Take 1 Tab by mouth nightly. Indications: mood stabilization and insomnia related to alcohol withdrawal  
 Quantity:  30 Tab Refills:  0 Current Immunizations Name Date Influenza Vaccine (Quad) PF 9/22/2017 Follow-up Information Follow up With Details Comments Contact Info Phys Other, MD   Patient can only remember the practice name and not the physician MALACHI SALEH On 10/11/2017 For therapy intake appointment with Mrs. Kay at 10:15 am. Will be provided a medication appointment a later date. Doctor Zackery [address] [place] [address] 
[phone] Discharge Instructions BEHAVIORAL HEALTH NURSING DISCHARGE NOTE Numbers to remember: 
Julio Quispe: [phone] [facility]: [phone] Suicide Preventions: [phone] (TALK) Diet: Regular The discharge information has been reviewed with the patient. The patient verbalized understanding. Patient armband removed and shredded Chart Review Routing History No Routing History on File

## 2017-09-19 NOTE — ED NOTES
Pt volunteered information regarding psychological status during triage. States his significant other recently left him with custody of a 2 month old daughter, that he has been experiencing a significant amount of stress. Reports intermittent auditory hallucinations and intermittent suicidal thoughts; denies any suicidal thoughts at this time. Dr Lam Servant notified; orders placed.

## 2017-09-19 NOTE — ED NOTES
Patient states that he has not slept. He is afraid to go to sleep because he fears he will not wake back up.

## 2017-09-19 NOTE — ED NOTES
Patient arrived by EMS A&O x4, no signs of distress noted in patient at this time. Will call Behavioral Services to make them aware patient is here.

## 2017-09-19 NOTE — ED NOTES
Verbal and bedside report given to Ulysses Laundry (GINGER); shift change performed at bedside with pt/family aware of plan of care at this time. Pt awake/alert; slightly restless but without distress. Conversant; reports last ETOH approx 4 hrs ago. Pt history/situation/complaint/findings as well as MAR reviewed. Pt and family have no further needs at this time.

## 2017-09-19 NOTE — ED NOTES
Pt provided breakfast biscuit and juice, and urine sample sent to lab. No distress noted at this time.

## 2017-09-19 NOTE — BSMART NOTE
OCCUPATIONAL THERAPY PROGRESS NOTE  Group Time:  0608  Attendance: The patient attended full group. Participation:  The patient participated with moderate elaboration in the activity. Attention:  The patient was able to focus on the activity. Interaction:  The patient acknowledges others or responds to questions,  with no spontaneous interaction. Discussed alcohol abuse, girlfriend recently leaving with 3 y.o. (thinks she may be having \"postpartum\" and says no one knows where she is. States he cannot eat well and has been \"throwing up\" lately.

## 2017-09-20 PROBLEM — F10.20 ALCOHOL USE DISORDER, SEVERE, DEPENDENCE (HCC): Status: ACTIVE | Noted: 2017-09-20

## 2017-09-20 PROBLEM — F10.930 ALCOHOL WITHDRAWAL SYNDROME WITHOUT COMPLICATION (HCC): Status: ACTIVE | Noted: 2017-09-20

## 2017-09-20 PROBLEM — R74.8 ELEVATED LIPASE: Status: ACTIVE | Noted: 2017-09-20

## 2017-09-20 PROBLEM — F33.2 MDD (MAJOR DEPRESSIVE DISORDER), RECURRENT SEVERE, WITHOUT PSYCHOSIS (HCC): Status: ACTIVE | Noted: 2017-09-19

## 2017-09-20 PROBLEM — R79.89 ELEVATED LFTS: Status: ACTIVE | Noted: 2017-09-20

## 2017-09-20 LAB — LIPASE SERPL-CCNC: 906 U/L (ref 73–393)

## 2017-09-20 PROCEDURE — 65220000003 HC RM SEMIPRIVATE PSYCH

## 2017-09-20 PROCEDURE — 36415 COLL VENOUS BLD VENIPUNCTURE: CPT | Performed by: PSYCHIATRY & NEUROLOGY

## 2017-09-20 PROCEDURE — 74011250637 HC RX REV CODE- 250/637: Performed by: PSYCHIATRY & NEUROLOGY

## 2017-09-20 PROCEDURE — 83690 ASSAY OF LIPASE: CPT | Performed by: PSYCHIATRY & NEUROLOGY

## 2017-09-20 RX ORDER — QUETIAPINE FUMARATE 25 MG/1
50 TABLET, FILM COATED ORAL
Status: DISCONTINUED | OUTPATIENT
Start: 2017-09-20 | End: 2017-09-21

## 2017-09-20 RX ORDER — CLONIDINE HYDROCHLORIDE 0.1 MG/1
0.1 TABLET ORAL 3 TIMES DAILY
Status: DISCONTINUED | OUTPATIENT
Start: 2017-09-20 | End: 2017-09-21

## 2017-09-20 RX ORDER — ONDANSETRON 2 MG/ML
4 INJECTION INTRAMUSCULAR; INTRAVENOUS
Status: DISCONTINUED | OUTPATIENT
Start: 2017-09-20 | End: 2017-09-22 | Stop reason: HOSPADM

## 2017-09-20 RX ORDER — ONDANSETRON 4 MG/1
4 TABLET, FILM COATED ORAL
Status: DISCONTINUED | OUTPATIENT
Start: 2017-09-20 | End: 2017-09-22 | Stop reason: HOSPADM

## 2017-09-20 RX ADMIN — Medication 2 CAPSULE: at 17:08

## 2017-09-20 RX ADMIN — LORAZEPAM 2 MG: 1 TABLET ORAL at 19:35

## 2017-09-20 RX ADMIN — CLONIDINE HYDROCHLORIDE 0.1 MG: 0.1 TABLET ORAL at 20:36

## 2017-09-20 RX ADMIN — LORAZEPAM 1 MG: 1 TABLET ORAL at 08:29

## 2017-09-20 RX ADMIN — Medication 2 CAPSULE: at 08:28

## 2017-09-20 RX ADMIN — LORAZEPAM 2 MG: 1 TABLET ORAL at 22:14

## 2017-09-20 RX ADMIN — LORAZEPAM 2 MG: 1 TABLET ORAL at 12:04

## 2017-09-20 RX ADMIN — Medication 2 CAPSULE: at 12:04

## 2017-09-20 RX ADMIN — QUETIAPINE FUMARATE 50 MG: 25 TABLET, FILM COATED ORAL at 20:37

## 2017-09-20 NOTE — PROGRESS NOTES
conducted an initial consultation and Spiritual Assessment for Cosme Martínez, who is a 25 y. o.,male. Patients Primary Language is: [place]. According to the patients EMR Nondenominational Affiliation is: Unknown. The reason the Patient came to the hospital is:   Patient Active Problem List    Diagnosis Date Noted    Alcohol use disorder, severe, dependence ([facility] 09/20/2017    Alcohol withdrawal syndrome without complication ([facility] 34/03/1043    Elevated LFTs 09/20/2017    Elevated lipase 09/20/2017    MDD (major depressive disorder), recurrent severe, without psychosis ([facility] 09/19/2017        The  provided the following Interventions:  Initiated a relationship of care and support. Explored issues of jessica, belief, spirituality. Listened empathically. Provided information about Spiritual Care Services. Offered prayer on patient's behalf. Chart reviewed. The following outcomes where achieved:  Patient shared limited information about both their medical narrative and life journey. Patient processed and shared feelings / experiences with the group. Patient expressed gratitude for 's visit. Assessment:  Patient was calm and cooperative in coming to group, seemed to be listening. Patient shared about children, expressed that they were both a source of hope and pain because he misses them. Patient described goals of getting well and pursuing employment as a . Plan:  Chaplains will continue to follow and will provide pastoral care on an as needed/requested basis.  recommends bedside caregivers page  on duty if patient shows signs of acute spiritual or emotional distress. [facility].  [facility] [phone]

## 2017-09-20 NOTE — BH NOTES
Cosme Martínez is  participating in [place]. Group time: 7501    Personal goal for participation: Community announcement    Goal orientation: community    Group therapy participation: fully participated    Therapeutic interventions reviewed and discussed: Staff discussed  Staff discussed the Mental Health programs offered. Unit schedule for groups,  Visiting hours, patient advocate name and phone number and where this information is posted on the unit. , etc,,. Report any maintenance/housekeeping or treatment concerns to staff so it can be addressed. Impression of participation: Pt.did not have any maintenance/housekeeping or treatment concerns to report to staff .

## 2017-09-20 NOTE — BSMART NOTE
OCCUPATIONAL THERAPY PROGRESS NOTE  Group Time:  3427  Attendance: The patient attended full group. Participation:  The patient participated with minimal elaboration in the activity. Attention:  The patient was able to focus on the activity. Interaction:  The patient acknowledges others or responds to questions,  with no spontaneous interaction. Quieter today. Says he feels OK. Little insight or knowledge about substance abuse.

## 2017-09-20 NOTE — BH NOTES
Treatment team met -     Medical Director: ____present   Psychiatrist: __x___present   Charge nurse: _x____present   MSW: __x___present   : _x____present   Nurse Manager: __x___present   Student RNs: ____present   Medical Students: _____present   Art Therapist: __x___present   Clinical Coordinator: __x___present   Internal : __x___present   Occupational Therapist: __x___present   Chaplain beltran  present    Plan of care discussed and updated as appropriate.

## 2017-09-20 NOTE — H&P
History and Physical        Patient: Danial Gloria               Sex: male          DOA: 9/19/2017         YOB: 1995      Age:  25 y.o.        LOS:  LOS: 1 day        HPI:     Danial Gloria is a 25 y.o. male who was admitted experiencing depression and alcohol dependence. Principal Problem:    MDD (major depressive disorder), recurrent severe, without psychosis ([address] (9/19/2017)    Active Problems:    Alcohol use disorder, severe, dependence ([address] (9/20/2017)      Alcohol withdrawal syndrome without complication ([address] (9/54/9475)      Elevated LFTs (9/20/2017)      Elevated lipase (9/20/2017)        Past Medical History:   Diagnosis Date    Alcohol use disorder, severe, dependence ([address] 9/20/2017    Alcohol withdrawal syndrome without complication ([address] 2/13/4817    MDD (major depressive disorder), recurrent severe, without psychosis ([facility] 9/19/2017       History reviewed. No pertinent surgical history. History reviewed. No pertinent family history. Social History     Social History    Marital status: SINGLE     Spouse name: N/A    Number of children: 1    Years of education: Some college     Social History Main Topics    Smoking status: Current Every Day Smoker    Smokeless tobacco: None    Alcohol use Yes      Comment: 5-6 liquor drinks daily    Drug use: No    Sexual activity: Not Asked     Other Topics Concern    None     Social History Narrative   Patient states he lives with his brother. States appetite has been poor, sleep okay. He works in overnight stocking. Prior to Admission medications    Not on File       No Known Allergies    Review of Systems  A comprehensive review of systems was negative.       Physical Exam:      Visit Vitals    BP (!) 169/102 (BP 1 Location: Right arm, BP Patient Position: Sitting)    Pulse 83    Temp 97.9 °F (36.6 °C)    Resp 16    Wt 160 lb (72.6 kg)    SpO2 99%    BMI 23.63 kg/m2       Physical Exam:    General: Alert, cooperative, no distress, well developed, well nourished AA male,appears stated age. Eyes:  Conjunctivae/corneas clear. PERRL, EOMs intact. Fundi benign   Ears:  Normal TMs and external ear canals both ears. Nose: Nares normal. Septum midline. Mucosa normal. No drainage or sinus tenderness. Mouth/Throat: Lips, mucosa, and tongue normal. Teeth and gums normal.   Neck: Supple, symmetrical, trachea midline, no adenopathy, thyroid: no enlargement/tenderness/nodules, no carotid bruit and no JVD. Back:   Symmetric, no curvature. ROM normal. No CVA tenderness. Lungs:   Clear to auscultation bilaterally. Heart:  Regular rate and rhythm, S1, S2 normal, no murmur, click, rub or gallop. Abdomen:   Soft, non-tender. Bowel sounds normal. No masses,  No organomegaly. Extremities: Extremities normal, atraumatic, no cyanosis or edema. Pulses: 2+ and symmetric all extremities. Skin: Skin color, texture, turgor normal. No rashes or lesions   Lymph nodes: Cervical, supraclavicular, and axillary nodes normal.   Neurologic: CNII-XII intact. Normal strength, sensation and reflexes throughout.              Assessment/Plan     Depression  Alcohol dependence  Labs reviewed  Continue treatment per physician's orders

## 2017-09-20 NOTE — BH NOTES
GROUP THERAPY PROGRESS NOTE    Troy Tracy is participating in Recreational Therapy. Group time: 7518-6147    Personal goal for participation: fresh air break/games on the unit    Goal orientation: social    Group therapy participation: active    Therapeutic interventions reviewed and discussed: Staff encouraged Pt to get off the unit and go outside and get some fresh air, or play games on the unit with peers. Impression of participation:     Pt. chose to stay on the unit, play games with peers, color lynn patterns and watch a movie.

## 2017-09-20 NOTE — PROGRESS NOTES
Problem: Suicide/Homicide (Adult/Pediatric)  Goal: *STG: Remains safe in hospital  Pt will remain safe daily while hospitalized. Outcome: Progressing Towards Goal  Pt remains safe. Goal: *STG/LTG: Complies with medication therapy  Pt will take medications as ordered daily while hospitalized. Outcome: Progressing Towards Goal  Pt takes medications as ordered. Problem: Falls - Risk of  Goal: *Absence of Falls  Document Adonay Fall Risk and appropriate interventions in the flowsheet. Outcome: Progressing Towards Goal  Fall Risk Interventions:         Pt remains free of falls. Medication Interventions: Teach patient to arise slowly                       Comments:   Patient has been present in the milieu for most of the day. Patient continues to deny SI/HI and AVH. Patient was medicated for withdrawal earlier with Ativan. Patient presents as confused today and seems to not understand everything going on. Patient is calm and cooperative on the unit and has attended groups and taken medications as ordered.

## 2017-09-21 LAB — LIPASE SERPL-CCNC: 1001 U/L (ref 73–393)

## 2017-09-21 PROCEDURE — 83690 ASSAY OF LIPASE: CPT | Performed by: PSYCHIATRY & NEUROLOGY

## 2017-09-21 PROCEDURE — 74011250637 HC RX REV CODE- 250/637: Performed by: PSYCHIATRY & NEUROLOGY

## 2017-09-21 PROCEDURE — 36415 COLL VENOUS BLD VENIPUNCTURE: CPT | Performed by: PSYCHIATRY & NEUROLOGY

## 2017-09-21 PROCEDURE — 65220000003 HC RM SEMIPRIVATE PSYCH

## 2017-09-21 RX ORDER — QUETIAPINE FUMARATE 100 MG/1
100 TABLET, FILM COATED ORAL
Status: DISCONTINUED | OUTPATIENT
Start: 2017-09-21 | End: 2017-09-22 | Stop reason: HOSPADM

## 2017-09-21 RX ORDER — CLONIDINE HYDROCHLORIDE 0.1 MG/1
0.15 TABLET ORAL 3 TIMES DAILY
Status: DISCONTINUED | OUTPATIENT
Start: 2017-09-21 | End: 2017-09-22 | Stop reason: HOSPADM

## 2017-09-21 RX ADMIN — Medication 2 CAPSULE: at 08:53

## 2017-09-21 RX ADMIN — CLONIDINE HYDROCHLORIDE 0.15 MG: 0.1 TABLET ORAL at 20:51

## 2017-09-21 RX ADMIN — Medication 2 CAPSULE: at 12:44

## 2017-09-21 RX ADMIN — Medication 2 CAPSULE: at 17:13

## 2017-09-21 RX ADMIN — CLONIDINE HYDROCHLORIDE 0.1 MG: 0.1 TABLET ORAL at 08:53

## 2017-09-21 RX ADMIN — QUETIAPINE FUMARATE 100 MG: 100 TABLET, FILM COATED ORAL at 20:51

## 2017-09-21 RX ADMIN — CLONIDINE HYDROCHLORIDE 0.15 MG: 0.1 TABLET ORAL at 13:41

## 2017-09-21 NOTE — PROGRESS NOTES
Problem: Falls - Risk of  Goal: *Absence of Falls  Document Adonay Fall Risk and appropriate interventions in the flowsheet. Outcome: Progressing Towards Goal  Fall Risk Interventions:     Pt remains free of falls. Medication Interventions: Teach patient to arise slowly                       Problem: Alcohol Withdrawal  Goal: *STG: Participates in treatment plan  Pt will participate daily in his care. Outcome: Progressing Towards Goal  Pt is participating in his care. Goal: *STG: Seeks staff when symptoms of withdrawal increase  Pt will notify staff when symptoms of withdrawal increase. Outcome: Progressing Towards Goal  Pt continues to notify staff when symptoms of withdrawal increase. Comments:   Patient has been visible in the milieu for most of the shift. Patient continues to present as confused when any questions are asked including questions about his withdrawal symptoms. Patient denies SI/HI and AVH and appears calmer today, although was unable to tell me whether he was feeling any better with his symptoms of withdrawal. Patient looked at his arms and held them out in front of him to determine if he felt better and then seemed confused again about the question.

## 2017-09-21 NOTE — BH NOTES
Pt. has been visible in the milieu this shift. Pt.  appears calm and cooperative in the milieu socializing with staff and peers. Pt. denies SI/HI. AV/H. Pt contracts for safety on the unit. Pt.denies any new medical/pain issues. Pt. did not have any visitors. Staff encouraged Pt. to communicate concerns to the Treatment Team to ensure accurate treatment is addressed. Pt agreed. Pt. remain free of falls and provided non skid socks. Staff will continue to monitor Pt. for behavior safety and location.

## 2017-09-21 NOTE — BH NOTES
Pt.i s a 25year old male with no prior psych hospitalization. Pt was admitted to this facility for having ideations to harm self  After drinking alcohol. SW Contact:  SW met with pt to discuss d/c planning. Pt. expressed he was ready for d.c/  SW discussed the importance of alcohol detox and medication compliance. SW provided pt with mental health and substance abuse education. SW discussed positive coping skills, relapse prevention and safety plan. Pt. is currently denying ideations and hallucinations.

## 2017-09-21 NOTE — PROGRESS NOTES
[address] [facility] [address] [address]  Inpatient Progress Note     Date of Service: 09/21/17  Hospital Day: 2     Subjective/Interval History   09/21/17    Treatment Team Notes:  Notes reviewed and/or discussed. CIWA: 7/7/5. Patient interview: Ce Gordon was interviewed by this writer today. Pt in bed sleeping this morning. States he kept waking last night due to nightmares which may be from Ativan. Pt has unusual dreams the prior evening after receiving Ativan. Pt shared he is feeling better this morning. Withdrawal symptoms present but under more control. Appetite stable. Denies medication side effects. Denies SI, HI and AVH. Objective     Vitals:    09/19/17 1448 09/19/17 1952 09/20/17 0730 09/21/17 0900   BP: (!) 156/109 (!) 145/97 (!) 169/102 (!) 142/96   Pulse: (!) 108 89 83 87   Resp: 18 18 16 18   Temp: 98.7 °F (37.1 °C) 98.3 °F (36.8 °C) 97.9 °F (36.6 °C) 97.4 °F (36.3 °C)   SpO2:       Weight:           Mental Status Examination     Appearance/Hygiene shows no evidence of impairment   Behavior/Social Relatedness Appropriate   Musculoskeletal Gait/Station: appropriate  Tone (flaccid, cogwheeling, spastic): not assessed  Psychomotor (hyperkinetic, hypokinetic): appropriate   Involuntary movements (tics, dyskinesias, akathisia, stereotypies): tremor present in hands. Speech   Rate, rhythm, volume, fluency and articulation are appropriate   Mood   \"I'm ok today. \"   Affect    Calm but anxious at times.    Thought Process Linear and goal directed   Thought Content and Perceptual Disturbances Denies self-injurious behavior (SIB), suicidal ideation (SI), aggressive behavior or homicidal ideation (HI)    Denies auditory and visual hallucinations   Sensorium and Cognition  AOx4, attention intact, memory intact, language use appropriate, and fund of knowledge age appropriate   Insight  fair   Judgment fair        Assessment/Plan      Psychiatric Diagnoses:   MDD, recurrent, severe without psychotic features  Alcohol use disorder, severe  Alcohol withdrawal     R/o adjustment disorder with depressed mood     Medical Diagnoses:   Alcohol withdrawal     Psychosocial and contextual factors:   Girlfriend leaving pt with 2 month old daughter  See HPI     Level of impairment/disability: Severe  Ladona Holstein is a 25 y.o. who is currently experiencing continued symptoms of alcohol withdrawal. Pt eating well. Experienced nightmares last night and the previous evening possibly from Ativan. Will continue to monitor. Discussed starting clonidine last night as this provider was on call for elevated BP due to withdrawals. BP remains elevated. Will check VS Q4H. Will increase CIWA checks to Q2H while awake. Denies SI, HI and AVH. 1.  Alcohol use disorder, severe   - Check CIWA Q2H while awake. - Check VS Q4H while awake. - increase clonidine to 0.15mg po TID today. 2. Increase quetiapine to 100mg po QHS for anxiety and insomnia related to alcohol withdrawals. 2.  Reviewed instructions, risks, benefits and side effects of medications  3. Disposition/Discharge Date: 9-25-17.      Apolonia Cuenca MD DR. [facility]  Psychiatry

## 2017-09-21 NOTE — BH NOTES
GROUP THERAPY PROGRESS NOTE    Jenny Wall was encouraged by staff but refused to participate in  Community.

## 2017-09-21 NOTE — BH NOTES
GROUP THERAPY PROGRESS NOTE    Shefali Garsia is participating in Recreational Therapy.      Group time: 30 min    Personal goal for participation: Social interaction and recreational activity    Goal orientation: social    Group therapy participation: active    Therapeutic interventions reviewed and discussed: Positive social interaction and recreational activity to reduce anxiety  Impression of participation: Pt.was calm,cooperactive and interacting positively with peers while engaged in unit activity

## 2017-09-21 NOTE — BH NOTES
GROUP THERAPY PROGRESS NOTE    Cosme Martínez is participating in Recreational Therapy.      Group time: 30 minutes    Personal goal for participation: fresh air break     Goal orientation: relaxation    Group therapy participation: active    Therapeutic interventions reviewed and discussed: He appeared to enjoy fresh air break     Impression of participation: The above ept was not a management problem during group

## 2017-09-21 NOTE — BH NOTES
GROUP THERAPY PROGRESS NOTE    Matias Keene is participating in [address] Group    Group time: [id number]    Personal goal for participation:  Seek information on Alcohol      Goal orientation: active  Group therapy participation:   Fully participated    Therapeutic interventions reviewed and discussed: Staff encouraged Pt. To participate in Group    Impression of participation:  [address] [facility] [facility] [address] members reviewed a film, then held an open discussion with Pt.  PtRoxie Benitez and received feedback while in group

## 2017-09-22 VITALS
TEMPERATURE: 96.8 F | SYSTOLIC BLOOD PRESSURE: 152 MMHG | BODY MASS INDEX: 23.63 KG/M2 | HEART RATE: 96 BPM | WEIGHT: 160 LBS | OXYGEN SATURATION: 99 % | DIASTOLIC BLOOD PRESSURE: 109 MMHG | RESPIRATION RATE: 18 BRPM

## 2017-09-22 PROCEDURE — 74011250637 HC RX REV CODE- 250/637: Performed by: PSYCHIATRY & NEUROLOGY

## 2017-09-22 PROCEDURE — 74011250636 HC RX REV CODE- 250/636: Performed by: PSYCHIATRY & NEUROLOGY

## 2017-09-22 PROCEDURE — 90686 IIV4 VACC NO PRSV 0.5 ML IM: CPT | Performed by: PSYCHIATRY & NEUROLOGY

## 2017-09-22 PROCEDURE — 90471 IMMUNIZATION ADMIN: CPT

## 2017-09-22 RX ORDER — CLONIDINE HYDROCHLORIDE 0.1 MG/1
TABLET ORAL
Qty: 6 TAB | Refills: 0 | Status: SHIPPED | OUTPATIENT
Start: 2017-09-22 | End: 2017-10-18

## 2017-09-22 RX ORDER — QUETIAPINE FUMARATE 100 MG/1
100 TABLET, FILM COATED ORAL
Qty: 30 TAB | Refills: 0 | Status: SHIPPED | OUTPATIENT
Start: 2017-09-22 | End: 2017-10-18

## 2017-09-22 RX ADMIN — Medication 2 CAPSULE: at 09:04

## 2017-09-22 RX ADMIN — CLONIDINE HYDROCHLORIDE 0.15 MG: 0.1 TABLET ORAL at 09:04

## 2017-09-22 RX ADMIN — INFLUENZA VIRUS VACCINE 0.5 ML: 15; 15; 15; 15 SUSPENSION INTRAMUSCULAR at 15:05

## 2017-09-22 RX ADMIN — Medication 2 CAPSULE: at 12:18

## 2017-09-22 NOTE — BH NOTES
Pt.i s a 25year old male with no prior psych hospitalization. Pt was admitted to this facility for having ideations to harm self  After drinking alcohol.      Pt.'s case was staff this a.m      KATYA Contact:  SW met with pt to discuss d/c plan. Pt. Is currently denying ideations and hallucinations. SW discussed positive coping skills, relapse prevention and safety plan.  Pt. plans to return to his admitting address. Pt has the following appointment: Pt. Has an therapy intake appointment Mrs. Kay @ 10/11/17 @ 10:15 at [facility]

## 2017-09-22 NOTE — BH NOTES
Hernán Zuniga attended 30 minute community nursing group. Pt was an active participant in this group. Unit staff was introduced to pts, unit rules and expectations for the shift were discussed.

## 2017-09-22 NOTE — PROGRESS NOTES
Problem: Suicide/Homicide (Adult/Pediatric)  Goal: *STG: Attends activities and groups  Pt will attend at least 3 groups daily while hospitalized. Outcome: Progressing Towards Goal  Encourage to attend groups. Goal: *STG/LTG: Complies with medication therapy  Pt will take medications as ordered daily while hospitalized. Outcome: Progressing Towards Goal  Patient is compliant with taking medications. Problem: Falls - Risk of  Goal: *Absence of Falls  Document Adonay Fall Risk and appropriate interventions in the flowsheet. Outcome: Progressing Towards Goal  Fall Risk Interventions:remains free from falls. Medication Interventions: Teach patient to arise slowly                       Comments:   Patient sitting quietly on unit. Social with select peers. Medication compliant. Encouraged to attend groups.

## 2017-09-22 NOTE — PROGRESS NOTES
[address] [facility] [address]  Inpatient Progress Note     Date of Service: 09/22/17  Hospital Day: 3     Subjective/Interval History   09/22/17    Treatment Team Notes:  Notes reviewed and/or discussed. Behaviorally appropriate. No CIWAs noted yesterday. Denies SI, HI and AVH. Patient interview: Kj Fields was interviewed by this writer today. Vital signs reviewed. BPs remain elevated. Last alcohol use was 9-. Pt denies cravings for alcohol. Pt questioning discharge. Pt shared his insurance company is no longer paying for the hospitalization. CIWA this morning after team was 0. Pt is medication compliant and without medication side effects including TD, EPS and akathisia. Denies SI, HI and AVH. Objective     Vitals:    09/21/17 1800 09/21/17 2102 09/22/17 0625 09/22/17 0800   BP: (!) 140/94 133/87 138/88 (!) 152/109   Pulse: (!) 108 84 86 96   Resp: 16 18 18 18   Temp: 98.2 °F (36.8 °C) 98.8 °F (37.1 °C) 97.9 °F (36.6 °C) 96.8 °F (36 °C)   SpO2:       Weight:           Mental Status Examination     Appearance/Hygiene shows no evidence of impairment   Behavior/Social Relatedness Appropriate   Musculoskeletal Gait/Station: appropriate  Tone (flaccid, cogwheeling, spastic): not assessed  Psychomotor (hyperkinetic, hypokinetic): appropriate   Involuntary movements (tics, dyskinesias, akathisia, stereotypies): tremor present in hands but pt shared this is his baseline tremor. Speech   Rate, rhythm, volume, fluency and articulation are appropriate   Mood   \"I'm feeling better today. \"   Affect    constricted   Thought Process Linear and goal directed   Thought Content and Perceptual Disturbances Denies self-injurious behavior (SIB), suicidal ideation (SI), aggressive behavior or homicidal ideation (HI)    Denies auditory and visual hallucinations   Sensorium and Cognition  AOx4, attention intact, memory intact, language use appropriate, and fund of knowledge age appropriate Insight  fair   Judgment fair        Assessment/Plan      Psychiatric Diagnoses:   MDD, recurrent, severe without psychotic features  Alcohol use disorder, severe  Alcohol withdrawal     R/o adjustment disorder with depressed mood     Medical Diagnoses:   Alcohol withdrawal     Psychosocial and contextual factors:   Girlfriend leaving pt with 2 month old daughter  See HPI     Level of impairment/disability: Severe  Matias Keene is a 25 y.o. who is currently experiencing improved symptoms of alcohol withdrawal.  Pt is currently 96 hour past his last drink. Pt willing to have his brother remove alcohol from the home. His brother does not consume alcohol per the pt. The pt is very likely outside of the window for alcohol withdrawal seizures to occur. Those generally occur at 48 hours or before his last drink. BP and heart rate lower this morning. Pt did not receive lorazepam yesterday with good results. Denies SI, HI and AVH. 1.  Alcohol use disorder, severe   - Check CIWA Q2H while awake. - Check VS Q4H while awake. - Continue clonidine to 0.15mg po TID today. 2. Continue quetiapine to 100mg po QHS for anxiety and insomnia related to alcohol withdrawals. 2.  Reviewed instructions, risks, benefits and side effects of medications  3. Disposition/Discharge Date: 9-22-17.      Omaira Porter MD DR. [facility]  Psychiatry

## 2017-09-22 NOTE — DISCHARGE INSTRUCTIONS
BEHAVIORAL HEALTH NURSING DISCHARGE NOTE    Numbers to remember:  Kenna Jefferson: [address] [facility]: [phone]  Suicide Preventions: [phone] Mickey Mccoy)    Diet: Regular    The discharge information has been reviewed with the patient. The patient verbalized understanding.     Patient armband removed and shredded

## 2017-09-22 NOTE — BH NOTES
Pt denies SI/HI and A/V hallucinations. Pt states depression is a \"One\" on a scale of 0-10 with 0 being no depression and 10 being worse imaginable depression. Pt states, \"When I came I was depressed because I was drinking. I am better now. \"  Pt compliant with medications, meals, groups, and ADLs. Pt wearing non-skid foot wear. No falls noted this shift. Will continue to monitor for safety and support as needed during treatment process.
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
## 2017-10-18 ENCOUNTER — HOSPITAL ENCOUNTER (EMERGENCY)
Age: 22
Discharge: HOME | End: 2017-10-19
Attending: EMERGENCY MEDICINE | Admitting: EMERGENCY MEDICINE
Payer: COMMERCIAL

## 2017-10-18 DIAGNOSIS — F10.10 ALCOHOL ABUSE: Primary | ICD-10-CM

## 2017-10-18 LAB
ALBUMIN SERPL-MCNC: 3.9 G/DL (ref 3.4–5)
ALBUMIN/GLOB SERPL: 0.8 {RATIO} (ref 0.8–1.7)
ALP SERPL-CCNC: 104 U/L (ref 45–117)
ALT SERPL-CCNC: 77 U/L (ref 16–61)
ANION GAP SERPL CALC-SCNC: 9 MMOL/L (ref 3–18)
AST SERPL-CCNC: 128 U/L (ref 15–37)
BASOPHILS # BLD: 0 K/UL (ref 0–0.1)
BASOPHILS NFR BLD: 0 % (ref 0–2)
BILIRUB SERPL-MCNC: 0.5 MG/DL (ref 0.2–1)
BUN SERPL-MCNC: 6 MG/DL (ref 7–18)
BUN/CREAT SERPL: 6 (ref 12–20)
CALCIUM SERPL-MCNC: 8.8 MG/DL (ref 8.5–10.1)
CHLORIDE SERPL-SCNC: 100 MMOL/L (ref 100–108)
CO2 SERPL-SCNC: 30 MMOL/L (ref 21–32)
CREAT SERPL-MCNC: 0.95 MG/DL (ref 0.6–1.3)
DIFFERENTIAL METHOD BLD: ABNORMAL
EOSINOPHIL # BLD: 0 K/UL (ref 0–0.4)
EOSINOPHIL NFR BLD: 0 % (ref 0–5)
ERYTHROCYTE [DISTWIDTH] IN BLOOD BY AUTOMATED COUNT: 13.4 % (ref 11.6–14.5)
ETHANOL SERPL-MCNC: 368 MG/DL (ref 0–3)
GLOBULIN SER CALC-MCNC: 4.6 G/DL (ref 2–4)
GLUCOSE SERPL-MCNC: 128 MG/DL (ref 74–99)
HCT VFR BLD AUTO: 43 % (ref 36–48)
HGB BLD-MCNC: 15.3 G/DL (ref 13–16)
LIPASE SERPL-CCNC: 214 U/L (ref 73–393)
LYMPHOCYTES # BLD: 1.5 K/UL (ref 0.9–3.6)
LYMPHOCYTES NFR BLD: 19 % (ref 21–52)
MCH RBC QN AUTO: 30.1 PG (ref 24–34)
MCHC RBC AUTO-ENTMCNC: 35.6 G/DL (ref 31–37)
MCV RBC AUTO: 84.5 FL (ref 74–97)
MONOCYTES # BLD: 0.2 K/UL (ref 0.05–1.2)
MONOCYTES NFR BLD: 3 % (ref 3–10)
NEUTS SEG # BLD: 6.2 K/UL (ref 1.8–8)
NEUTS SEG NFR BLD: 78 % (ref 40–73)
PLATELET # BLD AUTO: 238 K/UL (ref 135–420)
PMV BLD AUTO: 9.4 FL (ref 9.2–11.8)
POTASSIUM SERPL-SCNC: 4.1 MMOL/L (ref 3.5–5.5)
PROT SERPL-MCNC: 8.5 G/DL (ref 6.4–8.2)
RBC # BLD AUTO: 5.09 M/UL (ref 4.7–5.5)
SODIUM SERPL-SCNC: 139 MMOL/L (ref 136–145)
WBC # BLD AUTO: 7.9 K/UL (ref 4.6–13.2)

## 2017-10-18 PROCEDURE — 99284 EMERGENCY DEPT VISIT MOD MDM: CPT

## 2017-10-18 PROCEDURE — 80307 DRUG TEST PRSMV CHEM ANLYZR: CPT | Performed by: EMERGENCY MEDICINE

## 2017-10-18 PROCEDURE — 85025 COMPLETE CBC W/AUTO DIFF WBC: CPT | Performed by: EMERGENCY MEDICINE

## 2017-10-18 PROCEDURE — 83690 ASSAY OF LIPASE: CPT | Performed by: PHYSICIAN ASSISTANT

## 2017-10-18 PROCEDURE — 80307 DRUG TEST PRSMV CHEM ANLYZR: CPT | Performed by: PHYSICIAN ASSISTANT

## 2017-10-18 PROCEDURE — 80053 COMPREHEN METABOLIC PANEL: CPT | Performed by: PHYSICIAN ASSISTANT

## 2017-10-18 RX ORDER — ONDANSETRON 4 MG/1
4 TABLET, ORALLY DISINTEGRATING ORAL
Status: COMPLETED | OUTPATIENT
Start: 2017-10-18 | End: 2017-10-19

## 2017-10-18 RX ORDER — LORAZEPAM 1 MG/1
1 TABLET ORAL
Status: COMPLETED | OUTPATIENT
Start: 2017-10-18 | End: 2017-10-19

## 2017-10-19 VITALS
HEART RATE: 107 BPM | OXYGEN SATURATION: 99 % | TEMPERATURE: 99.2 F | DIASTOLIC BLOOD PRESSURE: 99 MMHG | BODY MASS INDEX: 22.15 KG/M2 | WEIGHT: 150 LBS | RESPIRATION RATE: 18 BRPM | SYSTOLIC BLOOD PRESSURE: 157 MMHG

## 2017-10-19 LAB
AMPHET UR QL SCN: NEGATIVE
APAP SERPL-MCNC: <2 UG/ML (ref 10–30)
APPEARANCE UR: CLEAR
BACTERIA URNS QL MICRO: NEGATIVE /HPF
BARBITURATES UR QL SCN: NEGATIVE
BENZODIAZ UR QL: NEGATIVE
BILIRUB UR QL: NEGATIVE
CANNABINOIDS UR QL SCN: NEGATIVE
COCAINE UR QL SCN: NEGATIVE
COLOR UR: YELLOW
EPITH CASTS URNS QL MICRO: NORMAL /LPF (ref 0–5)
ETHANOL SERPL-MCNC: 181 MG/DL (ref 0–3)
ETHANOL SERPL-MCNC: 255 MG/DL (ref 0–3)
GLUCOSE UR STRIP.AUTO-MCNC: NEGATIVE MG/DL
HDSCOM,HDSCOM: NORMAL
HGB UR QL STRIP: ABNORMAL
KETONES UR QL STRIP.AUTO: NEGATIVE MG/DL
LEUKOCYTE ESTERASE UR QL STRIP.AUTO: NEGATIVE
METHADONE UR QL: NEGATIVE
NITRITE UR QL STRIP.AUTO: NEGATIVE
OPIATES UR QL: NEGATIVE
PCP UR QL: NEGATIVE
PH UR STRIP: 6.5 [PH] (ref 5–8)
PROT UR STRIP-MCNC: NEGATIVE MG/DL
RBC #/AREA URNS HPF: NORMAL /HPF (ref 0–5)
SALICYLATES SERPL-MCNC: <2.8 MG/DL (ref 2.8–20)
SP GR UR REFRACTOMETRY: 1.01 (ref 1–1.03)
UROBILINOGEN UR QL STRIP.AUTO: 1 EU/DL (ref 0.2–1)
WBC URNS QL MICRO: NORMAL /HPF (ref 0–4)

## 2017-10-19 PROCEDURE — 96361 HYDRATE IV INFUSION ADD-ON: CPT

## 2017-10-19 PROCEDURE — 80307 DRUG TEST PRSMV CHEM ANLYZR: CPT | Performed by: PHYSICIAN ASSISTANT

## 2017-10-19 PROCEDURE — 74011250636 HC RX REV CODE- 250/636: Performed by: PHYSICIAN ASSISTANT

## 2017-10-19 PROCEDURE — 81001 URINALYSIS AUTO W/SCOPE: CPT | Performed by: EMERGENCY MEDICINE

## 2017-10-19 PROCEDURE — 74011250637 HC RX REV CODE- 250/637: Performed by: PHYSICIAN ASSISTANT

## 2017-10-19 PROCEDURE — 96360 HYDRATION IV INFUSION INIT: CPT

## 2017-10-19 PROCEDURE — 80307 DRUG TEST PRSMV CHEM ANLYZR: CPT | Performed by: EMERGENCY MEDICINE

## 2017-10-19 RX ORDER — SODIUM CHLORIDE 9 MG/ML
1000 INJECTION, SOLUTION INTRAVENOUS CONTINUOUS
Status: DISCONTINUED | OUTPATIENT
Start: 2017-10-19 | End: 2017-10-19 | Stop reason: HOSPADM

## 2017-10-19 RX ORDER — LORAZEPAM 1 MG/1
1 TABLET ORAL
Status: COMPLETED | OUTPATIENT
Start: 2017-10-19 | End: 2017-10-19

## 2017-10-19 RX ORDER — CHLORDIAZEPOXIDE HYDROCHLORIDE 5 MG/1
50 CAPSULE, GELATIN COATED ORAL
Qty: 30 CAP | Refills: 0 | Status: SHIPPED | OUTPATIENT
Start: 2017-10-19

## 2017-10-19 RX ORDER — THERA TABS 400 MCG
1 TAB ORAL DAILY
Status: DISCONTINUED | OUTPATIENT
Start: 2017-10-19 | End: 2017-10-19 | Stop reason: HOSPADM

## 2017-10-19 RX ADMIN — THERA TABS 1 TABLET: TAB at 10:31

## 2017-10-19 RX ADMIN — LORAZEPAM 1 MG: 1 TABLET ORAL at 10:30

## 2017-10-19 RX ADMIN — SODIUM CHLORIDE 1000 ML: 9 INJECTION, SOLUTION INTRAVENOUS at 10:30

## 2017-10-19 RX ADMIN — LORAZEPAM 1 MG: 1 TABLET ORAL at 01:57

## 2017-10-19 RX ADMIN — ONDANSETRON 4 MG: 4 TABLET, ORALLY DISINTEGRATING ORAL at 01:57

## 2017-10-19 RX ADMIN — SODIUM CHLORIDE 1000 ML: 9 INJECTION, SOLUTION INTRAVENOUS at 02:00

## 2017-10-19 NOTE — ED PROVIDER NOTES
HPI Comments: 11:42 PM  25 y.o. male with PMH of Etoh abuse who presents to ED C/O  alcohol abuse and SI x 3 weeks. Pt notes he drinks 4 beers a day. Last drink was at 830pm tonight. Notes he had an episode of emesis today with specks of blood. Notes his plan for SI is to \"drink himself to death\". Notes a new stressor occurred the last 2 weeks which caused him to start drinking again. Was admitted 9/19 for alcohol abuse and SI. Pt denies any other sxs or complaints. Written by Spenser Torres PA-C      Patient is a 25 y.o. male presenting with alcohol problem and vomiting. The history is provided by the patient and medical records. Alcohol Problem   There areno weakness present at this time. Associated symptoms include vomiting. Pertinent negatives include no fever and no nausea. Vomiting    Associated symptoms include abdominal pain (diffuse). Pertinent negatives include no chills, no fever and no diarrhea. Past Medical History:   Diagnosis Date    Alcohol use disorder, severe, dependence ([facility] 9/20/2017    Alcohol withdrawal syndrome without complication ([facility] 8/75/8127    MDD (major depressive disorder), recurrent severe, without psychosis ([facility] 9/19/2017       History reviewed. No pertinent surgical history. History reviewed. No pertinent family history. Social History     Social History    Marital status: SINGLE     Spouse name: N/A    Number of children: N/A    Years of education: N/A     Occupational History    Not on file. Social History Main Topics    Smoking status: Current Every Day Smoker    Smokeless tobacco: Not on file    Alcohol use Yes      Comment: 5-6 liquor drinks daily    Drug use: No    Sexual activity: Not on file     Other Topics Concern    Not on file     Social History Narrative         ALLERGIES: Review of patient's allergies indicates no known allergies. Review of Systems   Constitutional: Negative for chills, diaphoresis, fatigue and fever. Respiratory: Negative for shortness of breath. Cardiovascular: Negative for chest pain. Gastrointestinal: Positive for abdominal pain (diffuse) and vomiting. Negative for constipation, diarrhea and nausea. Genitourinary: Negative for dysuria. Skin: Negative for rash. Neurological: Negative for weakness. All other systems reviewed and are negative. Vitals:    10/18/17 2209   BP: (!) 147/98   Pulse: (!) 118   Resp: 23   Temp: 97.8 °F (36.6 °C)   SpO2: 97%   Weight: 68 kg (150 lb)            Physical Exam   Constitutional: He is oriented to person, place, and time. He appears well-developed and well-nourished. No distress. Smells of alcohol   HENT:   Head: Normocephalic and atraumatic. Neck: Normal range of motion. Neck supple. Cardiovascular: Regular rhythm and normal heart sounds. Exam reveals no gallop and no friction rub. No murmur heard. tachycardic   Pulmonary/Chest: Effort normal and breath sounds normal. No respiratory distress. He has no wheezes. He has no rales. Abdominal: Soft. Bowel sounds are normal. He exhibits no distension and no mass. There is no tenderness. There is no rebound and no guarding. Neurological: He is alert and oriented to person, place, and time. No cranial nerve deficit. GCS eye subscore is 4. GCS verbal subscore is 5. GCS motor subscore is 6. No tremors     Skin: Skin is warm. No rash noted. He is not diaphoretic. Psychiatric: He expresses suicidal ideation. He expresses no homicidal ideation. Nursing note and vitals reviewed.        MDM  Number of Diagnoses or Management Options  Alcohol abuse:     ED Course       Procedures    RESULTS:    No orders to display       Labs Reviewed   ETHYL ALCOHOL - Abnormal; Notable for the following:        Result Value    ALCOHOL(ETHYL),SERUM 368 (*)     All other components within normal limits   CBC WITH AUTOMATED DIFF - Abnormal; Notable for the following:     NEUTROPHILS 78 (*)     LYMPHOCYTES 19 (*)     All other components within normal limits   METABOLIC PANEL, COMPREHENSIVE - Abnormal; Notable for the following:     Glucose 128 (*)     BUN 6 (*)     BUN/Creatinine ratio 6 (*)     ALT (SGPT) 77 (*)     AST (SGOT) 128 (*)     Protein, total 8.5 (*)     Globulin 4.6 (*)     All other components within normal limits   SALICYLATE - Abnormal; Notable for the following:     Salicylate level <9.6 (*)     All other components within normal limits   ACETAMINOPHEN - Abnormal; Notable for the following:     Acetaminophen level <2 (*)     All other components within normal limits   URINALYSIS W/ RFLX MICROSCOPIC   DRUG SCREEN, URINE   LIPASE   ETHYL ALCOHOL       Recent Results (from the past 12 hour(s))   ETHYL ALCOHOL    Collection Time: 10/18/17 11:00 PM   Result Value Ref Range    ALCOHOL(ETHYL),SERUM 368 (HH) 0 - 3 MG/DL   CBC WITH AUTOMATED DIFF    Collection Time: 10/18/17 11:00 PM   Result Value Ref Range    WBC 7.9 4.6 - 13.2 K/uL    RBC 5.09 4.70 - 5.50 M/uL    HGB 15.3 13.0 - 16.0 g/dL    HCT 43.0 36.0 - 48.0 %    MCV 84.5 74.0 - 97.0 FL    MCH 30.1 24.0 - 34.0 PG    MCHC 35.6 31.0 - 37.0 g/dL    RDW 13.4 11.6 - 14.5 %    PLATELET 989 547 - 642 K/uL    MPV 9.4 9.2 - 11.8 FL    NEUTROPHILS 78 (H) 40 - 73 %    LYMPHOCYTES 19 (L) 21 - 52 %    MONOCYTES 3 3 - 10 %    EOSINOPHILS 0 0 - 5 %    BASOPHILS 0 0 - 2 %    ABS. NEUTROPHILS 6.2 1.8 - 8.0 K/UL    ABS. LYMPHOCYTES 1.5 0.9 - 3.6 K/UL    ABS. MONOCYTES 0.2 0.05 - 1.2 K/UL    ABS. EOSINOPHILS 0.0 0.0 - 0.4 K/UL    ABS.  BASOPHILS 0.0 0.0 - 0.1 K/UL    DF AUTOMATED     METABOLIC PANEL, COMPREHENSIVE    Collection Time: 10/18/17 11:00 PM   Result Value Ref Range    Sodium 139 136 - 145 mmol/L    Potassium 4.1 3.5 - 5.5 mmol/L    Chloride 100 100 - 108 mmol/L    CO2 30 21 - 32 mmol/L    Anion gap 9 3.0 - 18 mmol/L    Glucose 128 (H) 74 - 99 mg/dL    BUN 6 (L) 7.0 - 18 MG/DL    Creatinine 0.95 0.6 - 1.3 MG/DL    BUN/Creatinine ratio 6 (L) 12 - 20      GFR est AA >60 >60 ml/min/1.73m2    GFR est non-AA >60 >60 ml/min/1.73m2    Calcium 8.8 8.5 - 10.1 MG/DL    Bilirubin, total 0.5 0.2 - 1.0 MG/DL    ALT (SGPT) 77 (H) 16 - 61 U/L    AST (SGOT) 128 (H) 15 - 37 U/L    Alk. phosphatase 104 45 - 117 U/L    Protein, total 8.5 (H) 6.4 - 8.2 g/dL    Albumin 3.9 3.4 - 5.0 g/dL    Globulin 4.6 (H) 2.0 - 4.0 g/dL    A-G Ratio 0.8 0.8 - 1.7     LIPASE    Collection Time: 10/18/17 11:00 PM   Result Value Ref Range    Lipase 214 73 - 383 U/L   SALICYLATE    Collection Time: 10/18/17 11:00 PM   Result Value Ref Range    Salicylate level <2.2 (L) 2.8 - 20.0 MG/DL   ACETAMINOPHEN    Collection Time: 10/18/17 11:00 PM   Result Value Ref Range    Acetaminophen level <2 (L) 10 - 30 ug/mL     PROGRESS NOTE:   12:38 AM  Spoke with Lennox Mormon, crisis. Would recommend repeat Etoh at 5am.   Will be day shift that evaluates patient. 2:31 AM  Pt resting comfortably. 5:17 AM  Pt watching TV on phone. Pending Etoh level.    6:00AM  Signed out to Trang Palomino pending repeat Etoh level, UDS, and crisis consult. Written by Manfred Rivera PA-C    6:00AM Received report from [id number]. Pt pending medical clearance and crisis eval. REINALDO Wheatley    11:03 AM Pt seen by arthur Beltre. Per Asia Perez, pt is feeling better and wants to go home, refusing admission for detox. Pt states he was saying he was suicidal due to intoxication. Asia Perez will give him out pt referrals. To return if worse.  REINALDO Wheatley

## 2017-10-19 NOTE — ED NOTES
Mother at bedside and updated on plan of care. Pt requesting breakfast tray and updated that breakfast will be brought when it arrives.

## 2017-10-19 NOTE — BSMART NOTE
Comprehensive Assessment Form Part 1      Section l - Integrated Summary    Summary:  25year old male who presented to the ER intoxicated expressing thoughts of self harm. Interviewed in room 21 @ the request of Trish Cunha. Patient lying calmly, alert and oriented. Mother present at bedside and present during evaluation per patient request. Dressed appropriately with appropriate hygiene and grooming. Patient reports a relapse on alcohol about 2 weeks ago. Drinking on average 4 cans of \" Cocktail cans. \" states relapse triggered by him receiving a court summons regarding a car. Stated the car in question is the car his ex-girlfriend left with driving to [place]. He states he really doesn't know where the car is \" It possible was abandoned in [place]. \" In regards to self harm thoughts he admitted he had them when he was intoxicated but denies at interview. Patient offered inpatient detox but he declined \" I didn't like it when I was there. It wasn't good for me in there. \" Patient did not do his follow up at discharge from his previous admit. Mother at bedside states she will help him make an appointment. Section ll - Disposition      The Medical Doctor to Psychiatrist conference was not completed. The Medical Doctor is in agreement with Psychiatrist disposition because of (reason) declined voluntary detox, denied being suicidal at interview. Not meeting criteria to ask for a TDO evaluation. The plan is discussed with Andrzej Pierson plan to discharge from the ER as patient is declining inpatient treatment. .  Referred to outpatient Called [facility] and made appointment for patient. November 3rd @ 1500 with Bartolome Martinez.  Plan is for patient also to enroll in the [facility]    Rafal Patel RN

## 2017-10-19 NOTE — DISCHARGE INSTRUCTIONS
Acute Alcohol Intoxication: Care Instructions  Your Care Instructions  You have had treatment to help your body rid itself of alcohol. Too much alcohol upsets the body's fluid balance. Your doctor may have given you fluids and vitamins. For some people, drinking too much alcohol is a one-time event. For others, it is an ongoing problem. In either case, it is serious. It can be life-threatening. Follow-up care is a key part of your treatment and safety. Be sure to make and go to all appointments, and call your doctor if you are having problems. It's also a good idea to know your test results and keep a list of the medicines you take. How can you care for yourself at home? · Be safe with medicines. Take your medicines exactly as prescribed. Call your doctor if you think you are having a problem with your medicine. · Your doctor may have prescribed disulfiram (Antabuse). Do not drink any alcohol while you are taking this medicine. You may have severe or even life-threatening side effects from even small amounts of alcohol. · If you were given medicine to prevent nausea, be sure to take it exactly as prescribed. · Before you take any medicine, tell your doctor if:  ¨ You have had a bad reaction to any medicines in the past.  ¨ You are taking other medicines, including over-the-counter ones, or have other health problems. ¨ You are or could be pregnant. · Be prepared to have some symptoms of withdrawal in the next few days. · Drink plenty of liquids in the next few days. · Seek help if you need it to stop drinking. Getting counseling and joining a support group can help you stay sober. Try a support group such as Alcoholics Anonymous. · Avoid alcohol when you take medicines. It can react with many medicines and cause serious problems. When should you call for help? Call 911 anytime you think you may need emergency care.  For example, call if:  · You feel confused and are seeing things that are not there.  · You are thinking about killing yourself or hurting others. · You have a seizure. · You vomit blood or what looks like coffee grounds. Call your doctor now or seek immediate medical care if:  · You have trembling, restlessness, sweating, and other withdrawal symptoms that are new or that get worse. · Your withdrawal symptoms come back after not bothering you for days or weeks. · You can't stop vomiting. Watch closely for changes in your health, and be sure to contact your doctor if:  · You need help to stop drinking. Where can you learn more? Go to http://marii-darrick.info/. Enter T102 in the search box to learn more about \"Acute Alcohol Intoxication: Care Instructions. \"  Current as of: March 20, 2017  Content Version: 11.3  © 7746-4578 [employer]. Care instructions adapted under license by [employer] (which disclaims liability or warranty for this information). If you have questions about a medical condition or this instruction, always ask your healthcare professional. [place] [address] any warranty or liability for your use of this information.

## 2017-10-19 NOTE — ED NOTES
Patient ambulated to bathroom, slightly unsteady gait noted. Patient denies any complaints at this time.

## 2017-10-19 NOTE — ED NOTES
Pt reporting anxiety at this time, updated on ETOH levels and plan of care, Trang notified of vital signs.

## 2017-10-19 NOTE — ED TRIAGE NOTES
Patient stumbled into triage requesting assistance with alcohol abuse, he reports he threw up blood today. Patient admits to SI.  Chuy HI